# Patient Record
Sex: MALE | Race: WHITE | NOT HISPANIC OR LATINO
[De-identification: names, ages, dates, MRNs, and addresses within clinical notes are randomized per-mention and may not be internally consistent; named-entity substitution may affect disease eponyms.]

---

## 2020-11-03 ENCOUNTER — APPOINTMENT (OUTPATIENT)
Dept: PULMONOLOGY | Facility: CLINIC | Age: 50
End: 2020-11-03
Payer: COMMERCIAL

## 2020-11-03 VITALS
TEMPERATURE: 97.6 F | HEIGHT: 68 IN | WEIGHT: 160 LBS | HEART RATE: 86 BPM | RESPIRATION RATE: 15 BRPM | DIASTOLIC BLOOD PRESSURE: 79 MMHG | BODY MASS INDEX: 24.25 KG/M2 | OXYGEN SATURATION: 96 % | SYSTOLIC BLOOD PRESSURE: 109 MMHG

## 2020-11-03 DIAGNOSIS — Z83.3 FAMILY HISTORY OF DIABETES MELLITUS: ICD-10-CM

## 2020-11-03 PROCEDURE — 99072 ADDL SUPL MATRL&STAF TM PHE: CPT

## 2020-11-03 PROCEDURE — 99204 OFFICE O/P NEW MOD 45 MIN: CPT

## 2020-11-03 NOTE — REVIEW OF SYSTEMS
[Fatigue] : fatigue [Negative] : Psychiatric [EDS: ESS=____] : no daytime somnolence [Nasal Congestion] : no nasal congestion [Chest Pain] : no chest pain [Obesity] : not obese [Anemia] : no anemia [A.M. Headache] : no headache present upon awakening [Difficulty Initiating Sleep] : no difficulty falling asleep [Difficulty Maintaining Sleep] : no difficulty maintaining sleep [Lower Extremity Discomfort] : no lower extremity discomfort [Unusual Sleep Behavior] : no unusual sleep behavior [Cataplexy] :  no cataplexy

## 2020-11-03 NOTE — ASSESSMENT
[FreeTextEntry1] : 49yo M with history of deviated nasal septum, heavy snoring. In 2008, he had moderate ANTHONY diagnosed by PSG. However, when he tried the CPAP machine he was prescribed, he was unable to tolerate this. Wife has noticed heavy snoring, witnessed apneas, nocturnal gasping, choking. He is unsure about whether he wakes up frequently. he admits to less than 6 hours of sleep per night. He feels relatively refreshed in the morning. He denies morning headaches. He denies nasal congestion on a chronic basis. \par \par Will order HSAT to evaluate current severity of ANTHONY\par I explained the rationale for treatment of ANTHONY -- to improve quality of life, daytime function and to decrease the cardiometabolic and other medical risks that are associated with untreated ANTHONY. The patient verbalized understanding.\par I also explained that the patient can expect a follow up call once results of the above study becomes available.\par \par Will start APAP therapy once diagnostic report is available

## 2020-11-03 NOTE — PHYSICAL EXAM
[General Appearance - In No Acute Distress] : no acute distress [Normal Conjunctiva] : the conjunctiva exhibited no abnormalities [III] : III [Neck Appearance] : the appearance of the neck was normal [Heart Rate And Rhythm] : heart rate was normal and rhythm regular [Heart Sounds] : normal S1 and S2 [Respiration, Rhythm And Depth] : normal respiratory rhythm and effort [Auscultation Breath Sounds / Voice Sounds] : lungs were clear to auscultation bilaterally [Involuntary Movements] : no involuntary movements were seen [Nail Clubbing] : no clubbing of the fingernails [Non-Pitting] : non-pitting [Skin Color & Pigmentation] : normal skin color and pigmentation [No Focal Deficits] : no focal deficits [Oriented To Time, Place, And Person] : oriented to person, place, and time [Normal Oropharynx] : abnormal oropharynx

## 2020-11-03 NOTE — HISTORY OF PRESENT ILLNESS
[FreeTextEntry1] : 51yo M with history of deviated nasal septum, heavy snoring. In 2008, he had moderate ANTHONY diagnosed by PSG. However, when he tried the CPAP machine he was prescribed, he was unable to tolerate this. Wife has noticed heavy snoring, witnessed apneas, nocturnal gasping, choking. He is unsure about whether he wakes up frequently. he admits to less than 6 hours of sleep per night. He feels relatively refreshed in the morning. He denies morning headaches. He denies nasal congestion on a chronic basis. \par \par

## 2020-11-19 ENCOUNTER — TRANSCRIPTION ENCOUNTER (OUTPATIENT)
Age: 50
End: 2020-11-19

## 2020-11-23 ENCOUNTER — APPOINTMENT (OUTPATIENT)
Dept: SLEEP CENTER | Facility: CLINIC | Age: 50
End: 2020-11-23
Payer: COMMERCIAL

## 2020-11-24 ENCOUNTER — OUTPATIENT (OUTPATIENT)
Dept: OUTPATIENT SERVICES | Facility: HOSPITAL | Age: 50
LOS: 1 days | End: 2020-11-24
Payer: COMMERCIAL

## 2020-11-24 PROCEDURE — 95806 SLEEP STUDY UNATT&RESP EFFT: CPT

## 2020-11-24 PROCEDURE — 95806 SLEEP STUDY UNATT&RESP EFFT: CPT | Mod: 26

## 2020-12-01 DIAGNOSIS — G47.33 OBSTRUCTIVE SLEEP APNEA (ADULT) (PEDIATRIC): ICD-10-CM

## 2020-12-02 ENCOUNTER — NON-APPOINTMENT (OUTPATIENT)
Age: 50
End: 2020-12-02

## 2021-02-09 ENCOUNTER — APPOINTMENT (OUTPATIENT)
Dept: PULMONOLOGY | Facility: CLINIC | Age: 51
End: 2021-02-09
Payer: COMMERCIAL

## 2021-02-09 VITALS — BODY MASS INDEX: 26.15 KG/M2 | WEIGHT: 172 LBS | HEART RATE: 91 BPM | TEMPERATURE: 98 F | RESPIRATION RATE: 16 BRPM

## 2021-02-09 PROCEDURE — 99072 ADDL SUPL MATRL&STAF TM PHE: CPT

## 2021-02-09 PROCEDURE — 99215 OFFICE O/P EST HI 40 MIN: CPT

## 2021-02-10 ENCOUNTER — NON-APPOINTMENT (OUTPATIENT)
Age: 51
End: 2021-02-10

## 2021-02-17 NOTE — HISTORY OF PRESENT ILLNESS
[FreeTextEntry1] : 51yo M with history of deviated nasal septum, heavy snoring. In 2008, he had moderate ANTHONY diagnosed by PSG. However, when he tried the CPAP machine he was prescribed, he was unable to tolerate this. Wife has noticed heavy snoring, witnessed apneas, nocturnal gasping, choking and he repeated a sleep study recently. HSAT on 11/23/20 was indicative of severe ANTHONY -- LEIGH ANN was 39.5/h. He was prescribed APAP and now presents for follow up. \par He has received his APAP machine and uses it every night with a nasal pillows mask. He uses it every night for the whole night. With this, his snoring has resolved and he feels very energized in the morning. He has more energy during the day and denies any excessive daytime sleepiness. \par Machine is not linked online. \par His ears hurt and he is asking for an appointment with an ENT physician.

## 2021-02-17 NOTE — REVIEW OF SYSTEMS
[Negative] : Psychiatric [EDS: ESS=____] : no daytime somnolence [Fatigue] : no fatigue [Nasal Congestion] : no nasal congestion [Snoring] : no snoring [Witnessed Apneas] : no witnessed apnea [Chest Pain] : no chest pain [Obesity] : not obese [Anemia] : no anemia [A.M. Headache] : no headache present upon awakening [Difficulty Initiating Sleep] : no difficulty falling asleep [Difficulty Maintaining Sleep] : no difficulty maintaining sleep [Lower Extremity Discomfort] : no lower extremity discomfort [Unusual Sleep Behavior] : no unusual sleep behavior [Cataplexy] :  no cataplexy

## 2021-02-17 NOTE — PHYSICAL EXAM
[General Appearance - In No Acute Distress] : no acute distress [Normal Conjunctiva] : the conjunctiva exhibited no abnormalities [III] : III [Heart Rate And Rhythm] : heart rate was normal and rhythm regular [Heart Sounds] : normal S1 and S2 [Respiration, Rhythm And Depth] : normal respiratory rhythm and effort [Auscultation Breath Sounds / Voice Sounds] : lungs were clear to auscultation bilaterally [Involuntary Movements] : no involuntary movements were seen [Nail Clubbing] : no clubbing of the fingernails [Non-Pitting] : non-pitting [Skin Color & Pigmentation] : normal skin color and pigmentation [No Focal Deficits] : no focal deficits [Oriented To Time, Place, And Person] : oriented to person, place, and time [Neck Appearance] : the appearance of the neck was normal [Normal Oropharynx] : abnormal oropharynx

## 2021-02-22 ENCOUNTER — APPOINTMENT (OUTPATIENT)
Dept: PULMONOLOGY | Facility: CLINIC | Age: 51
End: 2021-02-22
Payer: COMMERCIAL

## 2021-02-22 VITALS
HEIGHT: 68 IN | HEART RATE: 75 BPM | BODY MASS INDEX: 24.55 KG/M2 | DIASTOLIC BLOOD PRESSURE: 90 MMHG | SYSTOLIC BLOOD PRESSURE: 123 MMHG | OXYGEN SATURATION: 97 % | TEMPERATURE: 98.2 F | WEIGHT: 162 LBS

## 2021-02-22 DIAGNOSIS — G47.33 OBSTRUCTIVE SLEEP APNEA (ADULT) (PEDIATRIC): ICD-10-CM

## 2021-02-22 DIAGNOSIS — Z99.89 OBSTRUCTIVE SLEEP APNEA (ADULT) (PEDIATRIC): ICD-10-CM

## 2021-02-22 PROCEDURE — 99211 OFF/OP EST MAY X REQ PHY/QHP: CPT

## 2021-02-22 PROCEDURE — 99072 ADDL SUPL MATRL&STAF TM PHE: CPT

## 2021-02-22 NOTE — PROCEDURE
[FreeTextEntry1] : DARLIN GOOD  is here in the office due to trouble tolerating CPAP mask.  \par 50 year old DARLIN GOOD on PAP therapy at [4-20] cm H2O for severe degree of ANTHONY, \par currently utilizing a Dreamwear nasal mask, was referred to our office for a mask fitting\par because pt wanted to see if there was a better mask option for him.   Pt does not like the tubing on top of the head.  \par \par (Therapeutic and compliance data download reveals usage ( 100   )% of days with an average use of (6 ) hours and   (19  ) minutes. Therapy based AHI is (2.9  )/hr on (4-20  ) cm H2O. \par \par The following service was provided for the patient in-office:\par -	Patient was fitted with the following mask(s): F&P Ikrti (S) . \par -	Patient was educated on mask interfaces, usage, fit, switching masks with tubing, and PAP cleaning tips.\par -	Patient was advised to try mask at home with their PAP machine. \par -	Will repeat data download in 1-week and follow-up with patient by phone. \par \par \par \par \par

## 2021-02-22 NOTE — ASSESSMENT
[FreeTextEntry1] : 50 year old DARLIN GOOD on APAP 4-20 cmH2O for severe ANTHONY (LEIGH ANN 39.5 on HST),  using a nasal pillows mask.  He was referred for a mask fitting for occasional excessive mask leakage at last data download.\par \par The patient recently started therapy, is tolerating it well, is pleased with instructions provided by Catarina to resolve mask leakage and the new mask provided.

## 2021-03-18 ENCOUNTER — APPOINTMENT (OUTPATIENT)
Dept: OTOLARYNGOLOGY | Facility: CLINIC | Age: 51
End: 2021-03-18
Payer: COMMERCIAL

## 2021-03-18 VITALS
WEIGHT: 160 LBS | SYSTOLIC BLOOD PRESSURE: 110 MMHG | DIASTOLIC BLOOD PRESSURE: 77 MMHG | BODY MASS INDEX: 24.25 KG/M2 | HEART RATE: 73 BPM | HEIGHT: 68 IN

## 2021-03-18 DIAGNOSIS — H90.5 UNSPECIFIED SENSORINEURAL HEARING LOSS: ICD-10-CM

## 2021-03-18 DIAGNOSIS — Z78.9 OTHER SPECIFIED HEALTH STATUS: ICD-10-CM

## 2021-03-18 DIAGNOSIS — J34.2 DEVIATED NASAL SEPTUM: ICD-10-CM

## 2021-03-18 DIAGNOSIS — Z80.9 FAMILY HISTORY OF MALIGNANT NEOPLASM, UNSPECIFIED: ICD-10-CM

## 2021-03-18 PROCEDURE — 92557 COMPREHENSIVE HEARING TEST: CPT

## 2021-03-18 PROCEDURE — 99204 OFFICE O/P NEW MOD 45 MIN: CPT | Mod: 25

## 2021-03-18 PROCEDURE — 92567 TYMPANOMETRY: CPT

## 2021-03-18 PROCEDURE — 99072 ADDL SUPL MATRL&STAF TM PHE: CPT

## 2021-03-18 NOTE — HISTORY OF PRESENT ILLNESS
[de-identified] : 50 year male referred by Dr. Mandy Lin, pulmonologist for ear check. Reports had bilateral ear infection swimming in a pool in 2008 and have had recurring annual ear infections since. Last ear infection was 02/2021 (right ear) and has wick placed for 72 hours. States feels that hearing has decreased over the years. Denies otalgia, otorrhea, tinnitus, dizziness, vertigo, headaches related to hearing. Uses Fluocinolone acetonide oil and Mometasone cream with relief.  He also has obstructive sleep apnea for which he had tried an oral appliance but did not work and is now using the CPAP. none

## 2021-03-18 NOTE — DATA REVIEWED
[de-identified] : Audiogram showed normal hearing bilaterally with exception of mild loss at 8K in both ears. [de-identified] : LEIGH ANN was 39.5

## 2021-03-18 NOTE — CONSULT LETTER
[Consult Letter:] : I had the pleasure of evaluating your patient, [unfilled]. [Please see my note below.] : Please see my note below. [Consult Closing:] : Thank you very much for allowing me to participate in the care of this patient.  If you have any questions, please do not hesitate to contact me. [Sincerely,] : Sincerely, [Dear  ___] : Dear  [unfilled], [FreeTextEntry3] : .

## 2021-03-18 NOTE — REASON FOR VISIT
[Initial Consultation] : an initial consultation for [FreeTextEntry2] : referred by Dr. Mandy Lin, pulmonologist for ear check.

## 2021-03-18 NOTE — PHYSICAL EXAM
[de-identified] : Right matt dry and scaly; left was normal [Normal] : no masses and lesions seen, face is symmetric

## 2021-07-27 ENCOUNTER — APPOINTMENT (OUTPATIENT)
Dept: PULMONOLOGY | Facility: CLINIC | Age: 51
End: 2021-07-27
Payer: COMMERCIAL

## 2021-07-27 VITALS
DIASTOLIC BLOOD PRESSURE: 78 MMHG | SYSTOLIC BLOOD PRESSURE: 121 MMHG | HEIGHT: 68 IN | OXYGEN SATURATION: 94 % | TEMPERATURE: 98 F | HEART RATE: 79 BPM | RESPIRATION RATE: 15 BRPM | WEIGHT: 162 LBS | BODY MASS INDEX: 24.55 KG/M2

## 2021-07-27 PROCEDURE — 99072 ADDL SUPL MATRL&STAF TM PHE: CPT

## 2021-07-27 PROCEDURE — 99215 OFFICE O/P EST HI 40 MIN: CPT

## 2021-07-27 NOTE — HISTORY OF PRESENT ILLNESS
[FreeTextEntry1] : 49yo M with history of deviated nasal septum, heavy snoring. In 2008, he had moderate ANTHONY diagnosed by PSG. However, when he tried the CPAP machine he was prescribed, he was unable to tolerate this. Wife has noticed heavy snoring, witnessed apneas, nocturnal gasping, choking and he repeated a sleep study recently. HSAT on 11/23/20 was indicative of severe ANTHONY -- LEIGH ANN was 39.5/h. He was prescribed APAP and now presents for follow up. \par He has received his APAP machine and uses it every night with a nasal pillows mask. He uses it every night for the whole night. With this, his snoring has resolved and he feels very energized in the morning. He has more energy during the day and denies any excessive daytime sleepiness. He is concerned about the recall and wants a resmed machine. \par \par Machine is now linked online. \par \par Data from machine was reviewed and is as follows:\par DME -- [facility]\par Device -- Dreamstation AutoCPAP\par Percent days with device usage in last 30 days -- 83.3%\par Average usage (days used) -- 5h 4min\par Average treatment related LEIGH ANN -- 2.8/h\par Mask leakage -- 32min excessive leak\par Pressure -- APAP 90% percentile pressure 9.8cm H20\par

## 2021-07-27 NOTE — ASSESSMENT
[FreeTextEntry1] : 51yo M with history of deviated nasal septum, heavy snoring. In 2008, he had moderate ANTHONY diagnosed by PSG. However, when he tried the CPAP machine he was prescribed, he was unable to tolerate this. Wife has noticed heavy snoring, witnessed apneas, nocturnal gasping, choking and he repeated a sleep study recently. HSAT on 11/23/20 was indicative of severe ANTHONY -- LEIGH ANN was 39.5/h. He was prescribed APAP and now presents for follow up. \par He has received his APAP machine and uses it every night with a nasal pillows mask. He uses it every night for the whole night. With this, his snoring has resolved and he feels very energized in the morning. He has more energy during the day and denies any excessive daytime sleepiness. He is concerned about the recall and wants a resmed machine. \par \par Machine is now linked online. \par \par Data from machine was reviewed and is as follows:\par DME -- [facility]\par Device -- Dreamstation AutoCPAP\par Percent days with device usage in last 30 days -- 83.3%\par Average usage (days used) -- 5h 4min\par Average treatment related LEIGH ANN -- 2.8/h\par Mask leakage -- 32min excessive leak\par Pressure -- APAP 90% percentile pressure 9.8cm H20\par \par Discussed the recent [employer] voluntary recall for Continuous and Non-Continuous Ventilators (certain CPAP, BiLevel PAP and Ventilator Devices) due to two issues related to the polyester-based polyurethane (PE-PUR) sound abatement foam used in these devices. The potential harm of inhalation or ingestion of the foam particles was discussed in detail with the patient. Symptoms such as headache, upper airway irritation, cough, chest pressure and sinus infection were discussed at length with the patient. \par Given that the patient has moderate to severe sleep disordered breathing, the decision was made to continue therapy after a very thorough discussion of the risks and benefits of continued use until their repaired or fully replaced. \par Patient was counseled to not use ozone-related cleaning products and adhere to their device Instructions for Use for approved cleaning methods.\par \par Will order new CPAP outside of insurance so that the patient can receive it soon. He has severe ANTHONY and benefits greatly from PAP treatment and should continue. \par The dangers of drowsy driving were discussed with the patient. The patient was warned to avoid drowsy driving. The patient will follow-up after he/she has heard from the [employer] company.\par \par

## 2021-10-18 ENCOUNTER — APPOINTMENT (OUTPATIENT)
Dept: NEPHROLOGY | Facility: CLINIC | Age: 51
End: 2021-10-18
Payer: COMMERCIAL

## 2021-10-18 VITALS
SYSTOLIC BLOOD PRESSURE: 118 MMHG | WEIGHT: 170 LBS | HEIGHT: 68 IN | HEART RATE: 51 BPM | OXYGEN SATURATION: 97 % | BODY MASS INDEX: 25.76 KG/M2 | DIASTOLIC BLOOD PRESSURE: 80 MMHG | TEMPERATURE: 97.2 F

## 2021-10-18 PROCEDURE — 99202 OFFICE O/P NEW SF 15 MIN: CPT

## 2021-10-18 NOTE — HISTORY OF PRESENT ILLNESS
[FreeTextEntry1] : 52 yo man for general checkup\par Hx ANTHONY uses CPAP\par Exercises\par Generally good diet\par Works as , drives into the city\par Seeing me to establish primary care relationship

## 2021-10-18 NOTE — ASSESSMENT
[FreeTextEntry1] : 50 yo man for general checkup\par Hx ANTHONY uses CPAP\par Exercises\par Generally good diet\par Works as , drives into the city\par \par In good health\par Had full [facility] exec exam- had skin cancer with Mohs procedure\par Has elevated LDL\par   positive family history\par   cont exercise, we discussed diet\par   Should probably start a statin- but needs a PCP to monitor therapy\par

## 2022-06-08 ENCOUNTER — APPOINTMENT (OUTPATIENT)
Dept: ALLERGY | Facility: CLINIC | Age: 52
End: 2022-06-08
Payer: COMMERCIAL

## 2022-06-08 ENCOUNTER — NON-APPOINTMENT (OUTPATIENT)
Age: 52
End: 2022-06-08

## 2022-06-08 ENCOUNTER — TRANSCRIPTION ENCOUNTER (OUTPATIENT)
Age: 52
End: 2022-06-08

## 2022-06-08 VITALS
TEMPERATURE: 98.7 F | HEART RATE: 82 BPM | DIASTOLIC BLOOD PRESSURE: 72 MMHG | RESPIRATION RATE: 15 BRPM | SYSTOLIC BLOOD PRESSURE: 115 MMHG | OXYGEN SATURATION: 97 %

## 2022-06-08 DIAGNOSIS — L23.7 ALLERGIC CONTACT DERMATITIS DUE TO PLANTS, EXCEPT FOOD: ICD-10-CM

## 2022-06-08 PROCEDURE — 99204 OFFICE O/P NEW MOD 45 MIN: CPT

## 2022-06-08 RX ORDER — MOMETASONE FUROATE 1 MG/G
0.1 CREAM TOPICAL DAILY
Qty: 45 | Refills: 0 | Status: ACTIVE | COMMUNITY
Start: 2022-06-08 | End: 1900-01-01

## 2022-06-08 RX ORDER — PREDNISONE 10 MG/1
10 TABLET ORAL
Qty: 26 | Refills: 0 | Status: ACTIVE | COMMUNITY
Start: 2022-06-08 | End: 1900-01-01

## 2022-06-08 RX ORDER — CEFAZOLIN 1 G/1
1 INJECTION, POWDER, FOR SOLUTION INTRAVENOUS
Qty: 1 | Refills: 0 | Status: ACTIVE | COMMUNITY
Start: 2022-06-08 | End: 1900-01-01

## 2022-06-08 RX ORDER — BETAMETHASONE DIPROPIONATE 0.5 MG/G
0.05 CREAM, AUGMENTED TOPICAL TWICE DAILY
Qty: 80 | Refills: 0 | Status: ACTIVE | COMMUNITY
Start: 2022-06-08 | End: 1900-01-01

## 2022-06-08 NOTE — PHYSICAL EXAM
[Alert] : alert [Well Nourished] : well nourished [Healthy Appearance] : healthy appearance [No Acute Distress] : no acute distress [Well Developed] : well developed [Normal Voice/Communication] : normal voice communication [No Neck Mass] : no neck mass was observed [No LAD] : no lymphadenopathy [Normal Rate and Effort] : normal respiratory rhythm and effort [No Crackles] : no crackles [No Retractions] : no retractions [Normal Rate] : heart rate was normal  [Normal S1, S2] : normal S1 and S2 [Regular Rhythm] : with a regular rhythm [Normal Mood] : mood was normal [Normal Affect] : affect was normal [Judgment and Insight Age Appropriate] : judgement and insight is age appropriate [Alert, Awake, Oriented as Age-Appropriate] : alert, awake, oriented as age appropriate [de-identified] : erythematous patches on temple - left eye - left flank and penis and scrotum

## 2022-06-08 NOTE — HISTORY OF PRESENT ILLNESS
[Asthma] : asthma [Allergic Rhinitis] : allergic rhinitis [Eczematous rashes] : eczematous rashes [Food Allergies] : food allergies [de-identified] : Patient was gardening over the weekend - pulling out weeds - he noted rash on left side of face with swelling - rash on abdomen - penis - scrotum - he had poison ivy years ago.   \par

## 2022-06-08 NOTE — SOCIAL HISTORY
[Spouse/Partner] : spouse/partner [House] : [unfilled] lives in a house  [] :  [de-identified] : 3 children  [FreeTextEntry2] :   [Smokers in Household] : there are no smokers in the home

## 2022-06-08 NOTE — ASSESSMENT
[FreeTextEntry1] : Poison Ivy \par \par Prednisone 40 mg QD x 3 D\par Prednisone 30 mg QD x 3 D\par Prednisone 20 mg QD x 3 D\par Prednisone 10 mg QD x 3 D\par Allegra 180 mg BID\par Diprolene BID below neck\par Elocon QD above neck \par \par Multiple antibiotic allergies - remote - RV Penicillin skin testing

## 2022-07-13 ENCOUNTER — APPOINTMENT (OUTPATIENT)
Dept: ALLERGY | Facility: CLINIC | Age: 52
End: 2022-07-13

## 2022-07-13 VITALS
SYSTOLIC BLOOD PRESSURE: 110 MMHG | DIASTOLIC BLOOD PRESSURE: 78 MMHG | WEIGHT: 160 LBS | HEIGHT: 68 IN | RESPIRATION RATE: 14 BRPM | OXYGEN SATURATION: 97 % | HEART RATE: 87 BPM | TEMPERATURE: 98.4 F | BODY MASS INDEX: 24.25 KG/M2

## 2022-07-13 DIAGNOSIS — Z88.1 ALLERGY STATUS TO OTHER ANTIBIOTIC AGENTS: ICD-10-CM

## 2022-07-13 PROCEDURE — 95018 ALL TSTG PERQ&IQ DRUGS/BIOL: CPT

## 2022-07-13 RX ORDER — AMOXICILLIN 250 MG/5ML
250 POWDER, FOR SUSPENSION ORAL 3 TIMES DAILY
Qty: 1 | Refills: 0 | Status: ACTIVE | COMMUNITY
Start: 2022-07-13 | End: 1900-01-01

## 2022-07-13 NOTE — ASSESSMENT
[FreeTextEntry1] : Skin testing to PrePen, Pen G and cefazolin are negative.   Patient to  for oral challenge to amoxicillin.

## 2022-07-20 ENCOUNTER — APPOINTMENT (OUTPATIENT)
Dept: ALLERGY | Facility: CLINIC | Age: 52
End: 2022-07-20

## 2022-07-20 PROCEDURE — 95076 INGEST CHALLENGE INI 120 MIN: CPT

## 2022-07-20 NOTE — IMPRESSION
[FreeTextEntry1] : Patient tolerated his oral amoxicillin challenge with no immediate allergic reaction

## 2022-07-20 NOTE — ASSESSMENT
[FreeTextEntry1] : Patient tolerated his oral challenge to amoxicillin with no immediate allergic symptoms.   He will take an additional four doses of amoxicillin.  He has been advised that she/he may have a delayed allergic reaction and this has been reviewed with the patient.  If he develops any delayed allergic symptoms patient was advised to contact the office.   In addition, patient will contact the office on Monday for further instructions.\par

## 2022-08-01 ENCOUNTER — NON-APPOINTMENT (OUTPATIENT)
Age: 52
End: 2022-08-01

## 2022-09-14 ENCOUNTER — APPOINTMENT (OUTPATIENT)
Dept: ALLERGY | Facility: CLINIC | Age: 52
End: 2022-09-14

## 2023-05-09 ENCOUNTER — APPOINTMENT (OUTPATIENT)
Dept: PULMONOLOGY | Facility: CLINIC | Age: 53
End: 2023-05-09

## 2023-08-14 ENCOUNTER — APPOINTMENT (OUTPATIENT)
Dept: PULMONOLOGY | Facility: CLINIC | Age: 53
End: 2023-08-14
Payer: COMMERCIAL

## 2023-08-14 DIAGNOSIS — G47.33 OBSTRUCTIVE SLEEP APNEA (ADULT) (PEDIATRIC): ICD-10-CM

## 2023-08-14 PROCEDURE — 99214 OFFICE O/P EST MOD 30 MIN: CPT | Mod: 95

## 2023-08-14 NOTE — ASSESSMENT
[FreeTextEntry1] : 51 y/o M with h/o deviated nasal septum, heavy snoring, initially diagnosed with sleep apnea in 2008 with prior intolerance to PAP therapy, with latest HSAT from 2020 showing severe ANTHONY--LEIGH ANN 39.5/hr, currently on APAP, presents for a follow- up visit. Patient recently received the DS 2 APAP device from [employer] to replace his recalled unit and has been using the new device since. Patient with variable sleep schedule due to his work schedule. Sleeps 4-5 hours/night with APAP and typically feels refreshed in the mornings without somnolence and drowsy driving.   --The patient will continue with nightly APAP use as he is deriving benefit from therapy, utilizing a nasal pillow mask, with normalization of AHI from 39.5/hr to 1.4/hr on 4-20 cmH20. 95th percentile pressure of 10.6 cmH20. No significant mask leak Nightly usage averaging to 4 hrs and 18 minutes.   --Rx for renewal of pap supplies for DS 2 APAP device was placed to [facility], [facility]   --Patient was advised to regularize sleep time--anchor a fixed wake up time and to attain a minimum of 7-hrs of sleep on a nightly basis and to increase PAP usage. The rationale and the importance of this was explained to the patient at length.  ----Sleep hygiene and Stimulus control measures were reviewed with the patient.   The ramifications of severe ANTHONY and the importance of continued treatment was discussed with the patient.  F/U in 1 year or sooner if needed.

## 2023-08-14 NOTE — HISTORY OF PRESENT ILLNESS
[CPAP: ___ cmH2O] : CPAP: [unfilled] cmH2O [Nocturnal Oxygen] : The patient does not use nocturnal oxygen [FreeTextEntry1] : DreamStation 2 Auto CPAP provided by [employer], [facility], on 12/16/2020.

## 2023-08-14 NOTE — REVIEW OF SYSTEMS
[Arthralgias] : arthralgias [Negative] : Psychiatric [Fatigue] : no fatigue [Nasal Congestion] : no nasal congestion [Postnasal Drip] : no postnasal drip [Chest Pain] : no chest pain [CHF] : no congestive heart failure [Thyroid Disease] : no thyroid disease [Diabetes] : no diabetes  [History of Iron Deficiency] : no history of iron deficiency [A.M. Headache] : no headache present upon awakening [Depression] : no depression [Snoring] : no snoring [Witnessed Apneas] : demonstrated no ~M apnea [Frequent Nocturnal Awakenings] : no nocturnal awakenings from sleep [Daytime Somnolence: ESS=____] : no daytime somnolence [Unintentional Sleep while inactive] : no unintentional sleep while inactive [Unintentional Sleep while active] : no unintentional sleep while active [Awakes Unrefreshed] : restorative sleep [Awakes With Headache] : awakes without a headache [Awakes With Dry Mouth] : awakes without dry mouth [Recent Wt Loss (___ Lbs)] : no recent weight loss [Recent Wt Gain (___ Lbs)] : no recent weight gain [Difficulty Initiating Sleep] : no difficulty falling asleep [Difficulty Maintaining Sleep] : no difficulty maintaining sleep [Irresistible urge to move legs] : no irresistible urge to move legs because of lower extremity discomfort [LE discomfort relieved by movement] : lower extremity discomfort not relieved by movement [Sleep Disturbances due to LE symptoms] : ~T no sleep disturbances due to lower extremity symptoms [Unusual Sleep Behavior] : no unusual sleep behavior [Hypersomnolence] : not sleeping much more than usual [Cataplexy] :  no cataplexy [Hypnogogic Hallucinations] : no hypnogogic hallucinations [Hypnopompic Hallucinations] : no hypnopompic hallucinations

## 2023-08-14 NOTE — REASON FOR VISIT
[Home] : at home, [unfilled] , at the time of the visit. [Medical Office: ([facility])___] : at the medical office located in  [Patient] : the patient [Follow-Up] : a follow-up visit [Sleep Apnea] : sleep apnea

## 2024-11-21 ENCOUNTER — APPOINTMENT (OUTPATIENT)
Dept: RADIOLOGY | Facility: IMAGING CENTER | Age: 54
End: 2024-11-21
Payer: COMMERCIAL

## 2024-11-21 ENCOUNTER — OUTPATIENT (OUTPATIENT)
Dept: OUTPATIENT SERVICES | Facility: HOSPITAL | Age: 54
LOS: 1 days | End: 2024-11-21
Payer: COMMERCIAL

## 2024-11-21 DIAGNOSIS — M47.816 SPONDYLOSIS WITHOUT MYELOPATHY OR RADICULOPATHY, LUMBAR REGION: ICD-10-CM

## 2024-11-21 PROCEDURE — 72082 X-RAY EXAM ENTIRE SPI 2/3 VW: CPT | Mod: 26

## 2024-11-21 PROCEDURE — 72082 X-RAY EXAM ENTIRE SPI 2/3 VW: CPT

## 2025-06-19 ENCOUNTER — DOCTOR'S OFFICE (OUTPATIENT)
Facility: LOCATION | Age: 55
Setting detail: OPHTHALMOLOGY
End: 2025-06-19
Payer: COMMERCIAL

## 2025-06-19 DIAGNOSIS — L03.213: ICD-10-CM

## 2025-06-19 PROCEDURE — 92002 INTRM OPH EXAM NEW PATIENT: CPT | Performed by: OPHTHALMOLOGY

## 2025-06-19 ASSESSMENT — VISUAL ACUITY
OS_BCVA: 20/40
OD_BCVA: 20/25-

## 2025-06-19 ASSESSMENT — REFRACTION_AUTOREFRACTION
OS_CYLINDER: +1.00
OS_AXIS: 160
OD_SPHERE: -1.25
OS_SPHERE: -1.25
OD_AXIS: 038
OD_CYLINDER: +0.50

## 2025-06-19 ASSESSMENT — REFRACTION_MANIFEST
OD_VA1: 20/20
OS_VA1: 20/20
OS_SPHERE: -1.50
OD_CYLINDER: SPH
OS_AXIS: 165
OD_SPHERE: -1.50
OS_CYLINDER: +0.50

## 2025-06-19 ASSESSMENT — KERATOMETRY
OD_K1POWER_DIOPTERS: 43.00
OD_K2POWER_DIOPTERS: 43.25
OD_AXISANGLE_DEGREES: 036
OS_K2POWER_DIOPTERS: 43.50
OS_AXISANGLE_DEGREES: 152
OS_K1POWER_DIOPTERS: 43.25